# Patient Record
Sex: MALE | Race: WHITE | NOT HISPANIC OR LATINO | Employment: OTHER | URBAN - METROPOLITAN AREA
[De-identification: names, ages, dates, MRNs, and addresses within clinical notes are randomized per-mention and may not be internally consistent; named-entity substitution may affect disease eponyms.]

---

## 2023-03-15 ENCOUNTER — TELEPHONE (OUTPATIENT)
Dept: PRIMARY CARE | Facility: CLINIC | Age: 73
End: 2023-03-15
Payer: MEDICARE

## 2023-03-15 DIAGNOSIS — I10 HYPERTENSION, UNSPECIFIED TYPE: ICD-10-CM

## 2023-03-15 RX ORDER — AMLODIPINE BESYLATE 5 MG/1
1 TABLET ORAL DAILY
COMMUNITY
End: 2023-03-15 | Stop reason: SDUPTHER

## 2023-03-22 RX ORDER — AMLODIPINE BESYLATE 5 MG/1
5 TABLET ORAL DAILY
Qty: 90 TABLET | Refills: 1 | Status: SHIPPED | OUTPATIENT
Start: 2023-03-22 | End: 2023-07-17

## 2023-03-27 PROBLEM — E78.5 HYPERLIPIDEMIA: Status: ACTIVE | Noted: 2023-03-27

## 2023-04-24 PROBLEM — I10 HYPERTENSION: Status: ACTIVE | Noted: 2023-04-24

## 2023-05-26 ENCOUNTER — TELEPHONE (OUTPATIENT)
Dept: PRIMARY CARE | Facility: CLINIC | Age: 73
End: 2023-05-26

## 2023-06-08 DIAGNOSIS — J45.902 ASTHMA WITH STATUS ASTHMATICUS, UNSPECIFIED ASTHMA SEVERITY, UNSPECIFIED WHETHER PERSISTENT (HHS-HCC): ICD-10-CM

## 2023-06-27 ENCOUNTER — APPOINTMENT (OUTPATIENT)
Dept: PRIMARY CARE | Facility: CLINIC | Age: 73
End: 2023-06-27
Payer: MEDICARE

## 2023-07-10 ENCOUNTER — OFFICE VISIT (OUTPATIENT)
Dept: PRIMARY CARE | Facility: CLINIC | Age: 73
End: 2023-07-10
Payer: MEDICARE

## 2023-07-10 VITALS
HEIGHT: 66 IN | DIASTOLIC BLOOD PRESSURE: 85 MMHG | SYSTOLIC BLOOD PRESSURE: 123 MMHG | HEART RATE: 67 BPM | WEIGHT: 174 LBS | OXYGEN SATURATION: 95 % | BODY MASS INDEX: 27.97 KG/M2

## 2023-07-10 DIAGNOSIS — I10 HYPERTENSION, UNSPECIFIED TYPE: Primary | ICD-10-CM

## 2023-07-10 PROCEDURE — 3079F DIAST BP 80-89 MM HG: CPT | Performed by: INTERNAL MEDICINE

## 2023-07-10 PROCEDURE — 93000 ELECTROCARDIOGRAM COMPLETE: CPT | Performed by: INTERNAL MEDICINE

## 2023-07-10 PROCEDURE — 3074F SYST BP LT 130 MM HG: CPT | Performed by: INTERNAL MEDICINE

## 2023-07-10 PROCEDURE — 99213 OFFICE O/P EST LOW 20 MIN: CPT | Performed by: INTERNAL MEDICINE

## 2023-07-10 PROCEDURE — 1036F TOBACCO NON-USER: CPT | Performed by: INTERNAL MEDICINE

## 2023-07-10 PROCEDURE — 1159F MED LIST DOCD IN RCRD: CPT | Performed by: INTERNAL MEDICINE

## 2023-07-10 RX ORDER — DUPILUMAB 100 MG/.67ML
INJECTION, SOLUTION SUBCUTANEOUS
COMMUNITY
Start: 2022-03-28

## 2023-07-10 NOTE — PROGRESS NOTES
"Subjective   Patient ID: Sterling Rae is a 72 y.o. male who presents for the following    Follow up    MEDICARE WELLNESS 2022    Assessment/Plan   Htn: exercise stress test and low dose calcium score testing.     IFG: stable, continue to diet and exercise     hld: stable, continue statin     htn: stable, continue amlodipine     colonoscopy may 11, 2022 with dr skaggs in 7 years       PATIENT WANTS FULL CODE       HPI  male with HLD,htn, asthma, impaired fasting glucose comes for a      Patient wants a calcium score but he was never a smoker. Patient denies any chest pain or pressure when he is walking. He does treadmill walking exercising about 1 hour each day. Mom  of MI. Dad passed from stroke.         HTN: patient denies any headaches, blurred vision or dizziness. patient denies any stroke like symptoms     HLD: Patient denies any muscle aches and is tolerating statin therapy     impaired fasting glucose: diet controlled.         social: patient denies any smoking, drug or alcohol abuse     family history: dad passed MI, mom passed MI     surgical history: hernia repair     recently retired  and he goes to the  for an hour daily        Visit Vitals  /85   Pulse 67   Ht 1.676 m (5' 6\")   Wt 78.9 kg (174 lb)   SpO2 95%   BMI 28.08 kg/m²   Smoking Status Never   BSA 1.92 m²     PHYSICAL EXAM     General appearance: Alert and in no acute distress. speech is clear and coherent  HEENT: Sclera and conjunctiva white, EOMI,     Respiratory : No respiratory distress, normal respiratory rhythm and effort. Clear bilateral breath sounds. No wheezing or rhonchi.   Cardiovascular: heart rate regular, S1, S2. no murmurs. no Lower extremity edema  Skin inspection: Normal skin color and pigmentation, normal skin turgor and no visible rash, induration, or cellulitis  MSK: 5/5 strength upper and lower extremities without gait abnormalities. no loss of muscle mass   Neuro: 2-12 CN grossly intact.  no slurred " speech. no lateralizing deficit  Psychiatric Orientation: Oriented to person, place, and time. no depression, homicidal or suicidal thoughts, normal affect     REVIEW OF SYSTEMS   Constitutional: not feeling tired and no fever, chills or sweats. no headache  Cardiovascular: no exertional chest pain, no palpitations, no lower extremity edema    Lungs: Denies shortness of breath, exertional dyspnea, wheezing  Gastrointestinal: no change in bowel habits, no diarrhea, no nausea,    Skin: no rashes, no change in skin color and pigmentation and no skin lumps.    Psychiatric: no depression and no anxiety.        Allergies   Allergen Reactions    Other Other       Current Outpatient Medications   Medication Sig Dispense Refill    amLODIPine (Norvasc) 5 mg tablet Take 1 tablet (5 mg) by mouth once daily. 90 tablet 1    atorvastatin (Lipitor) 10 mg tablet TAKE 1 TABLET BY MOUTH EVERY DAY 90 tablet 3    dupilumab (Dupixent Syringe) 100 mg/0.67 mL syringe injection Inject under the skin.      losartan-hydrochlorothiazide (Hyzaar) 100-12.5 mg tablet TAKE 1 TABLET BY MOUTH EVERY DAY 90 tablet 3     No current facility-administered medications for this visit.       Objective     No visits with results within 4 Month(s) from this visit.   Latest known visit with results is:   Legacy Encounter on 02/04/2020   Component Date Value Ref Range Status    Vitamin D, 25-Hydroxy 02/04/2020 33  ng/mL Final    Hemoglobin A1C 02/04/2020 6.0  % Final    Estimated Average Glucose 02/04/2020 126  MG/DL Final    TSH 02/04/2020 1.78  0.44 - 3.98 mIU/L Final    WBC 02/04/2020 5.7  4.4 - 11.3 x10E9/L Final    nRBC 02/04/2020 0.0  0.0 - 0.0 /100 WBC Final    RBC 02/04/2020 4.58  4.50 - 5.90 x10E12/L Final    Hemoglobin 02/04/2020 14.2  13.5 - 17.5 g/dL Final    Hematocrit 02/04/2020 44.0  41.0 - 52.0 % Final    MCV 02/04/2020 96  80 - 100 fL Final    MCHC 02/04/2020 32.3  32.0 - 36.0 g/dL Final    Platelets 02/04/2020 262  150 - 450 x10E9/L Final     RDW 02/04/2020 13.9  11.5 - 14.5 % Final    PSA 02/04/2020 2.84  0.00 - 4.00 ng/mL Final    Glucose 02/04/2020 97  74 - 99 mg/dL Final    Sodium 02/04/2020 141  136 - 145 mmol/L Final    Potassium 02/04/2020 3.8  3.5 - 5.3 mmol/L Final    Chloride 02/04/2020 103  98 - 107 mmol/L Final    Bicarbonate 02/04/2020 28  21 - 32 mmol/L Final    Anion Gap 02/04/2020 14  10 - 20 mmol/L Final    Urea Nitrogen 02/04/2020 16  6 - 23 mg/dL Final    Creatinine 02/04/2020 0.92  0.50 - 1.30 mg/dL Final    GLOMERULAR FILTRATION RATE-NON AFR* 02/04/2020 >60  >60 mL/min/1.73m2 Final    GLOMERULAR FILTRATION RATE-* 02/04/2020 >60  >60 mL/min/1.73m2 Final    Calcium 02/04/2020 9.6  8.6 - 10.6 mg/dL Final    Albumin 02/04/2020 4.1  3.4 - 5.0 g/dL Final    Alkaline Phosphatase 02/04/2020 68  33 - 136 U/L Final    Total Protein 02/04/2020 7.2  6.4 - 8.2 g/dL Final    AST 02/04/2020 31  9 - 39 U/L Final    Total Bilirubin 02/04/2020 0.5  0.0 - 1.2 mg/dL Final    ALT (SGPT) 02/04/2020 30  10 - 52 U/L Final    Cholesterol 02/04/2020 200 (H)  0 - 199 mg/dL Final    HDL 02/04/2020 48.7  mg/dL Final    Cholesterol/HDL Ratio 02/04/2020 4.1   Final    LDL 02/04/2020 120 (H)  0 - 99 mg/dL Final    VLDL 02/04/2020 32  0 - 40 mg/dL Final    Triglycerides 02/04/2020 158 (H)  0 - 149 mg/dL Final       Radiology: Reviewed imaging in powerchart.  No results found.    No family history on file.  Social History     Socioeconomic History    Marital status:      Spouse name: None    Number of children: None    Years of education: None    Highest education level: None   Occupational History    None   Tobacco Use    Smoking status: Never    Smokeless tobacco: Never   Substance and Sexual Activity    Alcohol use: None    Drug use: None    Sexual activity: None   Other Topics Concern    None   Social History Narrative    None     Social Determinants of Health     Financial Resource Strain: Not on file   Food Insecurity: Not on file   Transportation  Needs: Not on file   Physical Activity: Not on file   Stress: Not on file   Social Connections: Not on file   Intimate Partner Violence: Not on file   Housing Stability: Not on file     Past Medical History:   Diagnosis Date    Personal history of other diseases of the respiratory system 05/01/2014    Personal history of asthma    Personal history of urinary calculi     Personal history of renal calculi     Past Surgical History:   Procedure Laterality Date    HERNIA REPAIR  05/01/2014    Hernia Repair       Charting was completed using voice recognition technology and may include unintended errors.

## 2023-07-13 DIAGNOSIS — I10 HYPERTENSION, UNSPECIFIED TYPE: ICD-10-CM

## 2023-07-17 RX ORDER — AMLODIPINE BESYLATE 5 MG/1
TABLET ORAL
Qty: 90 TABLET | Refills: 1 | Status: SHIPPED | OUTPATIENT
Start: 2023-07-17 | End: 2024-03-08

## 2023-07-27 DIAGNOSIS — T56.0X1D CHRONIC LEAD-INDUCED GOUT INVOLVING TOE WITHOUT TOPHUS, UNSPECIFIED LATERALITY, SUBSEQUENT ENCOUNTER: ICD-10-CM

## 2023-07-27 DIAGNOSIS — M1A.1790 CHRONIC LEAD-INDUCED GOUT INVOLVING TOE WITHOUT TOPHUS, UNSPECIFIED LATERALITY, SUBSEQUENT ENCOUNTER: ICD-10-CM

## 2023-08-15 DIAGNOSIS — M10.9 ACUTE GOUT INVOLVING TOE, UNSPECIFIED CAUSE, UNSPECIFIED LATERALITY: ICD-10-CM

## 2023-08-15 RX ORDER — ALLOPURINOL 300 MG/1
TABLET ORAL
COMMUNITY
End: 2023-08-15 | Stop reason: SDUPTHER

## 2023-08-15 RX ORDER — ALLOPURINOL 300 MG/1
TABLET ORAL
Qty: 90 TABLET | Refills: 3 | Status: SHIPPED | OUTPATIENT
Start: 2023-08-15

## 2023-08-18 RX ORDER — ALLOPURINOL 300 MG/1
300 TABLET ORAL DAILY
Qty: 90 TABLET | Refills: 3 | Status: SHIPPED | OUTPATIENT
Start: 2023-08-18

## 2023-08-24 ENCOUNTER — TELEPHONE (OUTPATIENT)
Dept: PRIMARY CARE | Facility: CLINIC | Age: 73
End: 2023-08-24
Payer: MEDICARE

## 2023-08-24 NOTE — TELEPHONE ENCOUNTER
I left a general msg for pt to call me back  I made an appt for Aug 29th at 11am for this pt to follow up with Dr Cannon r/t his Ca scoring.

## 2023-08-25 ENCOUNTER — TELEPHONE (OUTPATIENT)
Dept: PRIMARY CARE | Facility: CLINIC | Age: 73
End: 2023-08-25
Payer: MEDICARE

## 2023-08-29 ENCOUNTER — APPOINTMENT (OUTPATIENT)
Dept: PRIMARY CARE | Facility: CLINIC | Age: 73
End: 2023-08-29
Payer: MEDICARE

## 2023-09-08 ENCOUNTER — OFFICE VISIT (OUTPATIENT)
Dept: PRIMARY CARE | Facility: CLINIC | Age: 73
End: 2023-09-08
Payer: MEDICARE

## 2023-09-08 VITALS
OXYGEN SATURATION: 96 % | BODY MASS INDEX: 27.8 KG/M2 | DIASTOLIC BLOOD PRESSURE: 74 MMHG | HEIGHT: 66 IN | WEIGHT: 173 LBS | SYSTOLIC BLOOD PRESSURE: 114 MMHG | TEMPERATURE: 98.3 F | HEART RATE: 68 BPM

## 2023-09-08 DIAGNOSIS — I10 HYPERTENSION, UNSPECIFIED TYPE: ICD-10-CM

## 2023-09-08 DIAGNOSIS — E78.5 HYPERLIPIDEMIA, UNSPECIFIED HYPERLIPIDEMIA TYPE: ICD-10-CM

## 2023-09-08 DIAGNOSIS — R73.01 IMPAIRED FASTING GLUCOSE: ICD-10-CM

## 2023-09-08 DIAGNOSIS — E55.9 VITAMIN D DEFICIENCY: ICD-10-CM

## 2023-09-08 DIAGNOSIS — E78.5 HYPERLIPIDEMIA, UNSPECIFIED: ICD-10-CM

## 2023-09-08 DIAGNOSIS — R97.20 ELEVATED PROSTATE SPECIFIC ANTIGEN LESS THAN 10 NG/ML: ICD-10-CM

## 2023-09-08 DIAGNOSIS — Z00.00 WELLNESS EXAMINATION: Primary | ICD-10-CM

## 2023-09-08 LAB
NON-UH HIE A/G RATIO: 1.2
NON-UH HIE ALB: 4.1 G/DL (ref 3.4–5)
NON-UH HIE ALK PHOS: 67 UNIT/L (ref 46–116)
NON-UH HIE BILIRUBIN, TOTAL: 0.9 MG/DL (ref 0.2–1)
NON-UH HIE BUN/CREAT RATIO: 21.1
NON-UH HIE BUN: 19 MG/DL (ref 9–23)
NON-UH HIE CALCIUM: 9.7 MG/DL (ref 8.7–10.4)
NON-UH HIE CALCULATED LDL CHOLESTEROL: 137 MG/DL (ref 60–130)
NON-UH HIE CALCULATED OSMOLALITY: 279 MOSM/KG (ref 275–295)
NON-UH HIE CHLORIDE: 102 MMOL/L (ref 98–107)
NON-UH HIE CHOLESTEROL: 214 MG/DL (ref 100–200)
NON-UH HIE CO2, VENOUS: 30 MMOL/L (ref 20–31)
NON-UH HIE CREATININE: 0.9 MG/DL (ref 0.6–1.1)
NON-UH HIE GFR AA: >60
NON-UH HIE GLOBULIN: 3.3 G/DL
NON-UH HIE GLOMERULAR FILTRATION RATE: >60 ML/MIN/1.73M?
NON-UH HIE GLUCOSE: 83 MG/DL (ref 74–106)
NON-UH HIE GOT: 30 UNIT/L (ref 15–37)
NON-UH HIE GPT: 28 UNIT/L (ref 10–49)
NON-UH HIE HCT: 42.2 % (ref 41–52)
NON-UH HIE HDL CHOLESTEROL: 45 MG/DL (ref 40–60)
NON-UH HIE HGB A1C: 5.6 %
NON-UH HIE HGB: 14.2 G/DL (ref 13.5–17.5)
NON-UH HIE INSTR WBC ND: 5.9
NON-UH HIE K: 4.6 MMOL/L (ref 3.5–5.1)
NON-UH HIE MCH: 31.4 PG (ref 27–34)
NON-UH HIE MCHC: 33.6 G/DL (ref 32–37)
NON-UH HIE MCV: 93.7 FL (ref 80–100)
NON-UH HIE MPV: 7.7 FL (ref 7.4–10.4)
NON-UH HIE NA: 139 MMOL/L (ref 135–145)
NON-UH HIE PLATELET: 251 X10 (ref 150–450)
NON-UH HIE PROSTATIC SPECIFIC AG SCREEN: 3.8 NG/ML (ref 0–4)
NON-UH HIE RBC: 4.5 X10 (ref 4.7–6.1)
NON-UH HIE RDW: 14.9 % (ref 11.5–14.5)
NON-UH HIE TOTAL CHOL/HDL CHOL RATIO: 4.8
NON-UH HIE TOTAL PROTEIN: 7.4 G/DL (ref 5.7–8.2)
NON-UH HIE TRIGLYCERIDES: 159 MG/DL (ref 30–150)
NON-UH HIE TSH: 2.01 UIU/ML (ref 0.55–4.78)
NON-UH HIE VIT D 25: 47 NG/ML
NON-UH HIE WBC: 5.9 X10 (ref 4.5–11)

## 2023-09-08 PROCEDURE — G0439 PPPS, SUBSEQ VISIT: HCPCS | Performed by: INTERNAL MEDICINE

## 2023-09-08 PROCEDURE — 99214 OFFICE O/P EST MOD 30 MIN: CPT | Performed by: INTERNAL MEDICINE

## 2023-09-08 PROCEDURE — 1160F RVW MEDS BY RX/DR IN RCRD: CPT | Performed by: INTERNAL MEDICINE

## 2023-09-08 PROCEDURE — 1036F TOBACCO NON-USER: CPT | Performed by: INTERNAL MEDICINE

## 2023-09-08 PROCEDURE — 3074F SYST BP LT 130 MM HG: CPT | Performed by: INTERNAL MEDICINE

## 2023-09-08 PROCEDURE — 1170F FXNL STATUS ASSESSED: CPT | Performed by: INTERNAL MEDICINE

## 2023-09-08 PROCEDURE — 1159F MED LIST DOCD IN RCRD: CPT | Performed by: INTERNAL MEDICINE

## 2023-09-08 PROCEDURE — 3078F DIAST BP <80 MM HG: CPT | Performed by: INTERNAL MEDICINE

## 2023-09-08 RX ORDER — ATORVASTATIN CALCIUM 20 MG/1
20 TABLET, FILM COATED ORAL DAILY
Qty: 90 TABLET | Refills: 3 | Status: SHIPPED | OUTPATIENT
Start: 2023-09-08 | End: 2024-09-02

## 2023-09-08 ASSESSMENT — ACTIVITIES OF DAILY LIVING (ADL)
DRESSING: INDEPENDENT
TAKING_MEDICATION: INDEPENDENT
DOING_HOUSEWORK: INDEPENDENT
MANAGING_FINANCES: INDEPENDENT
BATHING: INDEPENDENT
GROCERY_SHOPPING: INDEPENDENT

## 2023-09-08 ASSESSMENT — PATIENT HEALTH QUESTIONNAIRE - PHQ9
SUM OF ALL RESPONSES TO PHQ9 QUESTIONS 1 AND 2: 0
1. LITTLE INTEREST OR PLEASURE IN DOING THINGS: NOT AT ALL
2. FEELING DOWN, DEPRESSED OR HOPELESS: NOT AT ALL

## 2023-09-08 NOTE — PROGRESS NOTES
"Subjective   Patient ID: Sterling Rae is a 72 y.o. male who presents for the following    MEDICARE WELLNESS 9/16/2022  and follow up   Assessment/Plan   Elevated calcium score: 150s, recommend 81 mg aspirin , will reduce below 70     Htn: exercise stress test and low dose calcium score testing.     IFG: stable, continue to diet and exercise     hld: stable, continue statin,      htn: stable, continue amlodipine     colonoscopy may 11, 2022 with dr skaggs in 7 years       PATIENT WANTS FULL CODE       HPI  male with HLD,htn, asthma, impaired fasting glucose comes for a            Calcium score 150s recommend baby aspirin      HTN: patient denies any headaches, blurred vision or dizziness. patient denies any stroke like symptoms     HLD: Patient denies any muscle aches and is tolerating statin therapy     impaired fasting glucose: diet controlled.       social: patient denies any smoking, drug or alcohol abuse     family history: dad passed MI, mom passed MI     surgical history: hernia repair     recently retired 2021 and he goes to the  for an hour daily        Visit Vitals  /74 (BP Location: Right arm, Patient Position: Sitting)   Pulse 68   Temp 36.8 °C (98.3 °F)   Ht 1.676 m (5' 6\")   Wt 78.5 kg (173 lb)   SpO2 96%   BMI 27.92 kg/m²   Smoking Status Never   BSA 1.91 m²     PHYSICAL EXAM     General appearance: Alert and in no acute distress. speech is clear and coherent  HEENT: Sclera and conjunctiva white, EOMI,     Respiratory : No respiratory distress, normal respiratory rhythm and effort. Clear bilateral breath sounds. No wheezing or rhonchi.   Cardiovascular: heart rate regular, S1, S2. no murmurs. no Lower extremity edema  Skin inspection: Normal skin color and pigmentation, normal skin turgor and no visible rash, induration, or cellulitis  MSK: 5/5 strength upper and lower extremities without gait abnormalities. no loss of muscle mass   Neuro: 2-12 CN grossly intact.  no slurred speech. no " lateralizing deficit  Psychiatric Orientation: Oriented to person, place, and time. no depression, homicidal or suicidal thoughts, normal affect     REVIEW OF SYSTEMS   Constitutional: not feeling tired and no fever, chills or sweats. no headache  Cardiovascular: no exertional chest pain, no palpitations, no lower extremity edema    Lungs: Denies shortness of breath, exertional dyspnea, wheezing  Gastrointestinal: no change in bowel habits, no diarrhea, no nausea,    Skin: no rashes, no change in skin color and pigmentation and no skin lumps.    Psychiatric: no depression and no anxiety.        Allergies   Allergen Reactions   • Other Other       Current Outpatient Medications   Medication Sig Dispense Refill   • allopurinol (Zyloprim) 300 mg tablet TAKE 1 TABLET BY MOUTH EVERY DAY 90 tablet 3   • allopurinol (Zyloprim) 300 mg tablet Take 1 tablet daily 90 tablet 3   • amLODIPine (Norvasc) 5 mg tablet TAKE 1 TABLET BY MOUTH EVERY DAY 90 tablet 1   • atorvastatin (Lipitor) 10 mg tablet TAKE 1 TABLET BY MOUTH EVERY DAY 90 tablet 3   • dupilumab (Dupixent Syringe) 100 mg/0.67 mL syringe injection Inject under the skin.     • losartan-hydrochlorothiazide (Hyzaar) 100-12.5 mg tablet TAKE 1 TABLET BY MOUTH EVERY DAY 90 tablet 3     No current facility-administered medications for this visit.       Objective     No visits with results within 4 Month(s) from this visit.   Latest known visit with results is:   Legacy Encounter on 02/04/2020   Component Date Value Ref Range Status   • Vitamin D, 25-Hydroxy 02/04/2020 33  ng/mL Final   • Hemoglobin A1C 02/04/2020 6.0  % Final   • Estimated Average Glucose 02/04/2020 126  MG/DL Final   • TSH 02/04/2020 1.78  0.44 - 3.98 mIU/L Final   • WBC 02/04/2020 5.7  4.4 - 11.3 x10E9/L Final   • nRBC 02/04/2020 0.0  0.0 - 0.0 /100 WBC Final   • RBC 02/04/2020 4.58  4.50 - 5.90 x10E12/L Final   • Hemoglobin 02/04/2020 14.2  13.5 - 17.5 g/dL Final   • Hematocrit 02/04/2020 44.0  41.0 - 52.0  % Final   • MCV 02/04/2020 96  80 - 100 fL Final   • MCHC 02/04/2020 32.3  32.0 - 36.0 g/dL Final   • Platelets 02/04/2020 262  150 - 450 x10E9/L Final   • RDW 02/04/2020 13.9  11.5 - 14.5 % Final   • PSA 02/04/2020 2.84  0.00 - 4.00 ng/mL Final   • Glucose 02/04/2020 97  74 - 99 mg/dL Final   • Sodium 02/04/2020 141  136 - 145 mmol/L Final   • Potassium 02/04/2020 3.8  3.5 - 5.3 mmol/L Final   • Chloride 02/04/2020 103  98 - 107 mmol/L Final   • Bicarbonate 02/04/2020 28  21 - 32 mmol/L Final   • Anion Gap 02/04/2020 14  10 - 20 mmol/L Final   • Urea Nitrogen 02/04/2020 16  6 - 23 mg/dL Final   • Creatinine 02/04/2020 0.92  0.50 - 1.30 mg/dL Final   • GLOMERULAR FILTRATION RATE-NON AFR* 02/04/2020 >60  >60 mL/min/1.73m2 Final   • GLOMERULAR FILTRATION RATE-* 02/04/2020 >60  >60 mL/min/1.73m2 Final   • Calcium 02/04/2020 9.6  8.6 - 10.6 mg/dL Final   • Albumin 02/04/2020 4.1  3.4 - 5.0 g/dL Final   • Alkaline Phosphatase 02/04/2020 68  33 - 136 U/L Final   • Total Protein 02/04/2020 7.2  6.4 - 8.2 g/dL Final   • AST 02/04/2020 31  9 - 39 U/L Final   • Total Bilirubin 02/04/2020 0.5  0.0 - 1.2 mg/dL Final   • ALT (SGPT) 02/04/2020 30  10 - 52 U/L Final   • Cholesterol 02/04/2020 200 (H)  0 - 199 mg/dL Final   • HDL 02/04/2020 48.7  mg/dL Final   • Cholesterol/HDL Ratio 02/04/2020 4.1   Final   • LDL 02/04/2020 120 (H)  0 - 99 mg/dL Final   • VLDL 02/04/2020 32  0 - 40 mg/dL Final   • Triglycerides 02/04/2020 158 (H)  0 - 149 mg/dL Final       Radiology: Reviewed imaging in powerchart.  No results found.    No family history on file.  Social History     Socioeconomic History   • Marital status:      Spouse name: None   • Number of children: None   • Years of education: None   • Highest education level: None   Occupational History   • None   Tobacco Use   • Smoking status: Never   • Smokeless tobacco: Never   Substance and Sexual Activity   • Alcohol use: Not Currently   • Drug use: Never   • Sexual  activity: None   Other Topics Concern   • None   Social History Narrative   • None     Social Determinants of Health     Financial Resource Strain: Not on file   Food Insecurity: Not on file   Transportation Needs: Not on file   Physical Activity: Not on file   Stress: Not on file   Social Connections: Not on file   Intimate Partner Violence: Not on file   Housing Stability: Not on file     Past Medical History:   Diagnosis Date   • Personal history of other diseases of the respiratory system 05/01/2014    Personal history of asthma   • Personal history of urinary calculi     Personal history of renal calculi     Past Surgical History:   Procedure Laterality Date   • HERNIA REPAIR  05/01/2014    Hernia Repair       Charting was completed using voice recognition technology and may include unintended errors.

## 2023-09-19 ENCOUNTER — TELEPHONE (OUTPATIENT)
Dept: PRIMARY CARE | Facility: CLINIC | Age: 73
End: 2023-09-19
Payer: MEDICARE

## 2023-09-19 NOTE — TELEPHONE ENCOUNTER
Called pt back and left a general msg for him to call back    His triglyceride level is elevated and e needs to watch his carb intake  We will redraw labs at his next appt-needs to fast if he wants to have them drawn the same day as the appt

## 2023-09-22 ENCOUNTER — TELEPHONE (OUTPATIENT)
Dept: PRIMARY CARE | Facility: CLINIC | Age: 73
End: 2023-09-22
Payer: MEDICARE

## 2023-09-22 NOTE — TELEPHONE ENCOUNTER
Explained to watch carbs and fat intake and no med changes at this time. He verbalized udnerstanding

## 2023-10-04 ENCOUNTER — APPOINTMENT (OUTPATIENT)
Dept: PRIMARY CARE | Facility: CLINIC | Age: 73
End: 2023-10-04
Payer: MEDICARE

## 2024-03-07 DIAGNOSIS — I10 HYPERTENSION, UNSPECIFIED TYPE: ICD-10-CM

## 2024-03-08 RX ORDER — AMLODIPINE BESYLATE 5 MG/1
TABLET ORAL
Qty: 90 TABLET | Refills: 3 | Status: SHIPPED | OUTPATIENT
Start: 2024-03-08

## 2024-04-10 DIAGNOSIS — I10 ESSENTIAL (PRIMARY) HYPERTENSION: ICD-10-CM

## 2024-04-12 RX ORDER — LOSARTAN POTASSIUM AND HYDROCHLOROTHIAZIDE 12.5; 1 MG/1; MG/1
TABLET ORAL
Qty: 90 TABLET | Refills: 0 | Status: SHIPPED | OUTPATIENT
Start: 2024-04-12

## 2024-06-19 ENCOUNTER — TELEPHONE (OUTPATIENT)
Dept: PRIMARY CARE | Facility: CLINIC | Age: 74
End: 2024-06-19
Payer: MEDICARE

## 2024-06-19 NOTE — TELEPHONE ENCOUNTER
AMLODIPINE 5 MG   [facility] [phone]    I LEFT  FOR PT. RX WAS CALLED IN MARCH 90 DAY WITH 3 REFILLS

## 2024-07-12 DIAGNOSIS — I10 ESSENTIAL (PRIMARY) HYPERTENSION: ICD-10-CM

## 2024-07-12 RX ORDER — LOSARTAN POTASSIUM AND HYDROCHLOROTHIAZIDE 12.5; 1 MG/1; MG/1
TABLET ORAL
Qty: 90 TABLET | Refills: 3 | Status: SHIPPED | OUTPATIENT
Start: 2024-07-12

## 2024-08-02 ENCOUNTER — APPOINTMENT (OUTPATIENT)
Dept: PRIMARY CARE | Facility: CLINIC | Age: 74
End: 2024-08-02
Payer: MEDICARE

## 2024-08-13 ENCOUNTER — APPOINTMENT (OUTPATIENT)
Dept: PRIMARY CARE | Facility: CLINIC | Age: 74
End: 2024-08-13
Payer: MEDICARE

## 2024-08-13 VITALS
SYSTOLIC BLOOD PRESSURE: 116 MMHG | OXYGEN SATURATION: 93 % | BODY MASS INDEX: 28.77 KG/M2 | DIASTOLIC BLOOD PRESSURE: 76 MMHG | WEIGHT: 179 LBS | HEART RATE: 86 BPM | HEIGHT: 66 IN

## 2024-08-13 DIAGNOSIS — E78.5 HYPERLIPIDEMIA, UNSPECIFIED HYPERLIPIDEMIA TYPE: ICD-10-CM

## 2024-08-13 DIAGNOSIS — R73.01 IMPAIRED FASTING GLUCOSE: ICD-10-CM

## 2024-08-13 DIAGNOSIS — R97.20 ELEVATED PROSTATE SPECIFIC ANTIGEN LESS THAN 10 NG/ML: ICD-10-CM

## 2024-08-13 DIAGNOSIS — Z00.00 WELLNESS EXAMINATION: Primary | ICD-10-CM

## 2024-08-13 DIAGNOSIS — I10 HYPERTENSION, UNSPECIFIED TYPE: ICD-10-CM

## 2024-08-13 PROCEDURE — 1160F RVW MEDS BY RX/DR IN RCRD: CPT | Performed by: INTERNAL MEDICINE

## 2024-08-13 PROCEDURE — 1170F FXNL STATUS ASSESSED: CPT | Performed by: INTERNAL MEDICINE

## 2024-08-13 PROCEDURE — 3074F SYST BP LT 130 MM HG: CPT | Performed by: INTERNAL MEDICINE

## 2024-08-13 PROCEDURE — 3008F BODY MASS INDEX DOCD: CPT | Performed by: INTERNAL MEDICINE

## 2024-08-13 PROCEDURE — 3078F DIAST BP <80 MM HG: CPT | Performed by: INTERNAL MEDICINE

## 2024-08-13 PROCEDURE — 1159F MED LIST DOCD IN RCRD: CPT | Performed by: INTERNAL MEDICINE

## 2024-08-13 PROCEDURE — 1036F TOBACCO NON-USER: CPT | Performed by: INTERNAL MEDICINE

## 2024-08-13 PROCEDURE — 99214 OFFICE O/P EST MOD 30 MIN: CPT | Performed by: INTERNAL MEDICINE

## 2024-08-13 ASSESSMENT — ACTIVITIES OF DAILY LIVING (ADL)
DRESSING: INDEPENDENT
GROCERY_SHOPPING: INDEPENDENT
BATHING: INDEPENDENT
DOING_HOUSEWORK: INDEPENDENT
TAKING_MEDICATION: INDEPENDENT
MANAGING_FINANCES: INDEPENDENT

## 2024-08-13 ASSESSMENT — PATIENT HEALTH QUESTIONNAIRE - PHQ9
1. LITTLE INTEREST OR PLEASURE IN DOING THINGS: NOT AT ALL
SUM OF ALL RESPONSES TO PHQ9 QUESTIONS 1 AND 2: 0
2. FEELING DOWN, DEPRESSED OR HOPELESS: NOT AT ALL

## 2024-08-13 NOTE — PROGRESS NOTES
"Subjective   Patient ID: Sterling Rae is a 73 y.o. male who presents for the following  Chronic disease follow up  MEDICARE WELLNESS 8/13/2024  Assessment/Plan   Elevated calcium score: 150s, recommend 81 mg aspirin , will reduce below 70     Htn: exercise stress test and low dose calcium score testing.     IFG: stable, continue to diet and exercise     hld: stable, continue 10mg atorvastatin ( patient did not tolerate higher doses)     htn: stable, continue amlodipine     colonoscopy may 11, 2022 with dr skaggs in 7 years       PATIENT WANTS FULL CODE       HPI  male with HLD,htn, asthma, impaired fasting glucose comes for a      Calcium score 150s recommend baby aspirin      HTN: patient denies any headaches, blurred vision or dizziness. patient denies any stroke like symptoms     HLD: Patient denies any muscle aches and is tolerating statin therapy     impaired fasting glucose: diet controlled.       social: patient denies any smoking, drug or alcohol abuse     family history: dad passed MI, mom passed MI     surgical history: hernia repair     recently retired 2021 and he goes to the  for an hour daily        Visit Vitals  /76 (BP Location: Left arm, Patient Position: Sitting, BP Cuff Size: Adult)   Pulse 86   Ht 1.676 m (5' 6\")   Wt 81.2 kg (179 lb)   SpO2 93%   BMI 28.89 kg/m²   Smoking Status Never   BSA 1.94 m²     PHYSICAL EXAM   General appearance: Alert and in no acute distress. speech is clear and coherent  HEENT: Sclera and conjunctiva white, EOMI, uvela midline, no mouth lesions. PERRLA,  nasal turbinates are not swollen without exudate. TM's Lea with cone of light, external ear canal with scant cerumen. No head trauma  Neck: no carotid bruits or thyromegaly. no lymphadenopathy   Respiratory : No respiratory distress, normal respiratory rhythm and effort. Clear bilateral breath sounds. No wheezing or rhonchi.   Cardiovascular: heart rate regular, S1, S2. no murmurs. no Lower extremity " edema  Skin inspection: Normal skin color and pigmentation, normal skin turgor and no visible rash, induration, or cellulitis  MSK: 5/5 strength upper and lower extremities without gait abnormalities. no loss of muscle mass   Neuro: 2-12 CN grossly intact.  no slurred speech. no lateralizing deficit  Psychiatric Orientation: Oriented to person, place, and time. no depression, homicidal or suicidal thoughts, normal affect  Abdomen: soft, none tender, none distended. no organomegaly      REVIEW OF SYSTEMS   Constitutional: not feeling tired and no fever, chills or sweats. no headache  Cardiovascular: no exertional chest pain, no palpitations, no lower extremity edema    Lungs: Denies shortness of breath, exertional dyspnea, wheezing  Gastrointestinal: no change in bowel habits, no diarrhea, no nausea,    Skin: no rashes, no change in skin color and pigmentation and no skin lumps.    Psychiatric: no depression and no anxiety.        Allergies   Allergen Reactions    Fish Containing Products Rash and Shortness of breath    Tree Nuts Rash and Shortness of breath    Other Other       Current Outpatient Medications   Medication Sig Dispense Refill    allopurinol (Zyloprim) 300 mg tablet Take 1 tablet daily 90 tablet 3    amLODIPine (Norvasc) 5 mg tablet TAKE 1 TABLET BY MOUTH EVERY DAY 90 tablet 3    atorvastatin (Lipitor) 10 mg tablet TAKE 1 TABLET BY MOUTH EVERY DAY 90 tablet 2    dupilumab (Dupixent Syringe) 100 mg/0.67 mL syringe injection Inject under the skin.      losartan-hydrochlorothiazide (Hyzaar) 100-12.5 mg tablet TAKE 1 TABLET BY MOUTH EVERY DAY 90 tablet 3    allopurinol (Zyloprim) 300 mg tablet TAKE 1 TABLET BY MOUTH EVERY DAY 90 tablet 3    atorvastatin (Lipitor) 20 mg tablet Take 1 tablet (20 mg) by mouth once daily. (Patient not taking: Reported on 8/13/2024) 90 tablet 3     No current facility-administered medications for this visit.       Objective     No visits with results within 4 Month(s) from this  visit.   Latest known visit with results is:   Orders Only on 09/08/2023   Component Date Value Ref Range Status    [facility] MCHC 09/08/2023 33.6  32.0 - 37.0 g/dL Final    [facility] Instr WBC ND 09/08/2023 5.9   Final    [facility] MCV 09/08/2023 93.7  80.0 - 100.0 fL Final    [facility] HGB 09/08/2023 14.2  13.5 - 17.5 g/dL Final    [facility] MPV 09/08/2023 7.7  7.4 - 10.4 fL Final    [facility] WBC 09/08/2023 5.9  4.5 - 11.0 x10 Final    [facility] RDW 09/08/2023 14.9 (H)  11.5 - 14.5 % Final    [facility] MCH 09/08/2023 31.4  27.0 - 34.0 pg Final    [facility] HCT 09/08/2023 42.2  41.0 - 52.0 % Final    [facility] RBC 09/08/2023 4.50 (L)  4.70 - 6.10 x10 Final    [facility] Platelet 09/08/2023 251  150 - 450 x10 Final    [facility] Prostatic Specific Ag S* 09/08/2023 3.8  0.0 - 4.0 ng/mL Final    [facility] Alk Phos 09/08/2023 67  46 - 116 unit/L Final    [facility] GOT 09/08/2023 30  15 - 37 unit/L Final    [facility] GFR AA 09/08/2023 >60   Final    [facility] Bilirubin, Total 09/08/2023 0.90  0.20 - 1.00 mg/dL Final    [facility] K 09/08/2023 4.6  3.5 - 5.1 mmol/L Final    [facility] A/G Ratio 09/08/2023 1.2   Final    [facility] CO2, venous 09/08/2023 30.0  20.0 - 31.0 mmol/L Final    [facility] GPT 09/08/2023 28  10 - 49 unit/L Final    [facility] Chloride 09/08/2023 102  98 - 107 mmol/L Final    [facility] Calcium 09/08/2023 9.7  8.7 - 10.4 mg/dL Final    [facility] Total Protein 09/08/2023 7.4  5.7 - 8.2 g/dL Final    [facility] Creatinine 09/08/2023 0.9  0.6 - 1.1 mg/dL Final    [facility] ALB 09/08/2023 4.1  3.4 - 5.0 g/dL Final    [facility] BUN 09/08/2023 19  9 - 23 mg/dL Final    [facility] Glomerular Filtration R* 09/08/2023 >60  mL/min/1.73m? Final    [facility] Calculated Osmolality 09/08/2023 279  275 - 295 mOsm/kg Final    [facility] Glucose 09/08/2023 83  74 - 106 mg/dL Final    [facility] Globulin 09/08/2023 3.3  g/dL Final    [facility] Na 09/08/2023 139  135 - 145 mmol/L Final    [facility]  [facility] BUN/Creat Ratio 09/08/2023 21.1   Final    [facility] Vit D 25 09/08/2023 47  ng/mL Final    [facility] TSH 09/08/2023 2.01  0.55 - 4.78 uIU/ml Final    [facility] Calculated LDL [place]ol[place]* 09/08/2023 137 (H)  60 - 130 mg/dL Final    [facility] HDL Cholesterol 09/08/2023 45  40 - 60 mg/dL Final    [facility] Total Chol/HDL Chol Rat* 09/08/2023 4.8   Final    [facility] Triglycerides 09/08/2023 159 (H)  30 - 150 mg/dL Final    [facility] Cholesterol 09/08/2023 214 (H)  100 - 200 mg/dL Final    [facility] HGB A1C 09/08/2023 5.6  % Final       Radiology: Reviewed imaging in powerchart.  No results found.    No family history on file.  Social History     Socioeconomic History    Marital status:    Tobacco Use    Smoking status: Never    Smokeless tobacco: Never   Substance and Sexual Activity    Alcohol use: Not Currently    Drug use: Never     Past Medical History:   Diagnosis Date    Personal history of other diseases of the respiratory system 05/01/2014    Personal history of asthma    Personal history of urinary calculi     Personal history of renal calculi     Past Surgical History:   Procedure Laterality Date    HERNIA REPAIR  05/01/2014    Hernia Repair       Charting was completed using voice recognition technology and may include unintended errors.

## 2024-08-15 ENCOUNTER — LAB (OUTPATIENT)
Dept: LAB | Facility: LAB | Age: 74
End: 2024-08-15
Payer: MEDICARE

## 2024-08-15 DIAGNOSIS — I10 HYPERTENSION, UNSPECIFIED TYPE: ICD-10-CM

## 2024-08-15 DIAGNOSIS — E78.5 HYPERLIPIDEMIA, UNSPECIFIED HYPERLIPIDEMIA TYPE: ICD-10-CM

## 2024-08-15 DIAGNOSIS — E55.9 VITAMIN D DEFICIENCY: ICD-10-CM

## 2024-08-15 DIAGNOSIS — R97.20 ELEVATED PROSTATE SPECIFIC ANTIGEN LESS THAN 10 NG/ML: ICD-10-CM

## 2024-08-15 DIAGNOSIS — R73.01 IMPAIRED FASTING GLUCOSE: ICD-10-CM

## 2024-08-15 LAB
25(OH)D3 SERPL-MCNC: 31 NG/ML (ref 30–100)
ALBUMIN SERPL BCP-MCNC: 4.4 G/DL (ref 3.4–5)
ALP SERPL-CCNC: 57 U/L (ref 33–136)
ALT SERPL W P-5'-P-CCNC: 28 U/L (ref 10–52)
ANION GAP SERPL CALC-SCNC: 10 MMOL/L (ref 10–20)
AST SERPL W P-5'-P-CCNC: 29 U/L (ref 9–39)
BILIRUB SERPL-MCNC: 0.8 MG/DL (ref 0–1.2)
BUN SERPL-MCNC: 18 MG/DL (ref 6–23)
CALCIUM SERPL-MCNC: 9.5 MG/DL (ref 8.6–10.3)
CHLORIDE SERPL-SCNC: 103 MMOL/L (ref 98–107)
CHOLEST SERPL-MCNC: 185 MG/DL (ref 0–199)
CHOLESTEROL/HDL RATIO: 3.7
CO2 SERPL-SCNC: 30 MMOL/L (ref 21–32)
CREAT SERPL-MCNC: 0.93 MG/DL (ref 0.5–1.3)
EGFRCR SERPLBLD CKD-EPI 2021: 87 ML/MIN/1.73M*2
ERYTHROCYTE [DISTWIDTH] IN BLOOD BY AUTOMATED COUNT: 14 % (ref 11.5–14.5)
EST. AVERAGE GLUCOSE BLD GHB EST-MCNC: 123 MG/DL
GLUCOSE SERPL-MCNC: 92 MG/DL (ref 74–99)
HBA1C MFR BLD: 5.9 %
HCT VFR BLD AUTO: 43.3 % (ref 41–52)
HDLC SERPL-MCNC: 50.6 MG/DL
HGB BLD-MCNC: 14.4 G/DL (ref 13.5–17.5)
LDLC SERPL CALC-MCNC: 108 MG/DL
MCH RBC QN AUTO: 32.1 PG (ref 26–34)
MCHC RBC AUTO-ENTMCNC: 33.3 G/DL (ref 32–36)
MCV RBC AUTO: 97 FL (ref 80–100)
NON HDL CHOLESTEROL: 134 MG/DL (ref 0–149)
NRBC BLD-RTO: 0 /100 WBCS (ref 0–0)
PLATELET # BLD AUTO: 268 X10*3/UL (ref 150–450)
POTASSIUM SERPL-SCNC: 3.9 MMOL/L (ref 3.5–5.3)
PROT SERPL-MCNC: 7.4 G/DL (ref 6.4–8.2)
PSA SERPL-MCNC: 4.37 NG/ML
RBC # BLD AUTO: 4.48 X10*6/UL (ref 4.5–5.9)
SODIUM SERPL-SCNC: 139 MMOL/L (ref 136–145)
TRIGL SERPL-MCNC: 132 MG/DL (ref 0–149)
TSH SERPL-ACNC: 3.28 MIU/L (ref 0.44–3.98)
VLDL: 26 MG/DL (ref 0–40)
WBC # BLD AUTO: 5.8 X10*3/UL (ref 4.4–11.3)

## 2024-08-15 PROCEDURE — 84153 ASSAY OF PSA TOTAL: CPT

## 2024-08-15 PROCEDURE — 85027 COMPLETE CBC AUTOMATED: CPT

## 2024-08-15 PROCEDURE — 80061 LIPID PANEL: CPT

## 2024-08-15 PROCEDURE — 83036 HEMOGLOBIN GLYCOSYLATED A1C: CPT

## 2024-08-15 PROCEDURE — 84443 ASSAY THYROID STIM HORMONE: CPT

## 2024-08-15 PROCEDURE — 36415 COLL VENOUS BLD VENIPUNCTURE: CPT

## 2024-08-15 PROCEDURE — 82306 VITAMIN D 25 HYDROXY: CPT

## 2024-08-15 PROCEDURE — 80053 COMPREHEN METABOLIC PANEL: CPT

## 2024-08-16 DIAGNOSIS — R97.20 ELEVATED PROSTATE SPECIFIC ANTIGEN LESS THAN 10 NG/ML: Primary | ICD-10-CM

## 2024-08-19 ENCOUNTER — TELEPHONE (OUTPATIENT)
Dept: PRIMARY CARE | Facility: CLINIC | Age: 74
End: 2024-08-19
Payer: MEDICARE

## 2024-08-19 NOTE — TELEPHONE ENCOUNTER
----- Message from Betito Cannon sent at 8/16/2024  3:59 PM EDT -----  Recommend repeat PSA in 3 months. No appointment necessary. I just want to track your psa closer.

## 2024-08-19 NOTE — TELEPHONE ENCOUNTER
Lvmtcb Recommend repeat PSA in 3 months. No appointment necessary. I just want to track your psa closer.

## 2024-08-21 ENCOUNTER — TELEPHONE (OUTPATIENT)
Dept: PRIMARY CARE | Facility: CLINIC | Age: 74
End: 2024-08-21
Payer: MEDICARE

## 2024-08-22 ENCOUNTER — TELEPHONE (OUTPATIENT)
Dept: PRIMARY CARE | Facility: CLINIC | Age: 74
End: 2024-08-22
Payer: MEDICARE

## 2024-09-02 DIAGNOSIS — M10.9 ACUTE GOUT INVOLVING TOE, UNSPECIFIED CAUSE, UNSPECIFIED LATERALITY: ICD-10-CM

## 2024-09-03 RX ORDER — ALLOPURINOL 300 MG/1
TABLET ORAL
Qty: 90 TABLET | Refills: 3 | Status: SHIPPED | OUTPATIENT
Start: 2024-09-03

## 2024-10-18 ENCOUNTER — LAB (OUTPATIENT)
Dept: LAB | Facility: LAB | Age: 74
End: 2024-10-18
Payer: MEDICARE

## 2024-10-18 DIAGNOSIS — R97.20 ELEVATED PROSTATE SPECIFIC ANTIGEN LESS THAN 10 NG/ML: ICD-10-CM

## 2024-10-18 LAB — PSA SERPL-MCNC: 3.79 NG/ML

## 2024-10-18 PROCEDURE — 36415 COLL VENOUS BLD VENIPUNCTURE: CPT

## 2024-10-18 PROCEDURE — 84153 ASSAY OF PSA TOTAL: CPT

## 2024-10-24 ENCOUNTER — TELEPHONE (OUTPATIENT)
Dept: PRIMARY CARE | Facility: CLINIC | Age: 74
End: 2024-10-24
Payer: MEDICARE

## 2024-10-24 NOTE — TELEPHONE ENCOUNTER
----- Message from Betito Cannon sent at 10/24/2024  7:09 AM EDT -----  Fyi PSA trended down. Will follow up with another PSA in 6 months to follow closely

## 2024-10-25 ENCOUNTER — TELEPHONE (OUTPATIENT)
Dept: PRIMARY CARE | Facility: CLINIC | Age: 74
End: 2024-10-25
Payer: MEDICARE

## 2025-01-11 DIAGNOSIS — E78.5 HYPERLIPIDEMIA, UNSPECIFIED HYPERLIPIDEMIA TYPE: ICD-10-CM

## 2025-01-13 RX ORDER — ATORVASTATIN CALCIUM 10 MG/1
10 TABLET, FILM COATED ORAL DAILY
Qty: 90 TABLET | Refills: 3 | Status: SHIPPED | OUTPATIENT
Start: 2025-01-13 | End: 2025-01-15 | Stop reason: SDUPTHER

## 2025-01-15 DIAGNOSIS — E78.5 HYPERLIPIDEMIA, UNSPECIFIED HYPERLIPIDEMIA TYPE: ICD-10-CM

## 2025-01-15 RX ORDER — ATORVASTATIN CALCIUM 10 MG/1
10 TABLET, FILM COATED ORAL DAILY
Qty: 90 TABLET | Refills: 3 | Status: SHIPPED | OUTPATIENT
Start: 2025-01-15

## 2025-01-27 DIAGNOSIS — I10 ESSENTIAL (PRIMARY) HYPERTENSION: ICD-10-CM

## 2025-01-27 RX ORDER — LOSARTAN POTASSIUM AND HYDROCHLOROTHIAZIDE 12.5; 1 MG/1; MG/1
1 TABLET ORAL DAILY
Qty: 90 TABLET | Refills: 3 | Status: SHIPPED | OUTPATIENT
Start: 2025-01-27

## 2025-03-15 DIAGNOSIS — I10 HYPERTENSION, UNSPECIFIED TYPE: ICD-10-CM

## 2025-03-18 RX ORDER — AMLODIPINE BESYLATE 5 MG/1
TABLET ORAL
Qty: 90 TABLET | Refills: 3 | Status: SHIPPED | OUTPATIENT
Start: 2025-03-18

## 2025-04-15 DIAGNOSIS — Z12.5 SCREENING FOR PROSTATE CANCER: Primary | ICD-10-CM

## 2025-04-17 LAB — PSA SERPL-MCNC: 4.42 NG/ML

## 2025-04-22 ENCOUNTER — TELEPHONE (OUTPATIENT)
Dept: PRIMARY CARE | Facility: CLINIC | Age: 75
End: 2025-04-22
Payer: MEDICARE

## 2025-04-22 NOTE — TELEPHONE ENCOUNTER
----- Message from Betito Cannon sent at 4/18/2025 10:20 AM EDT -----  PSA trended up quicker then expected. Recommend referral to urology for further assessment at this time. ([facility] would be good)  ----- Message -----  From: [facility] Results In  Sent: 4/17/2025  11:25 PM EDT  To: Betito Cannon, DO

## 2025-06-25 DIAGNOSIS — D64.9 ANEMIA, UNSPECIFIED TYPE: Primary | ICD-10-CM

## 2025-06-25 DIAGNOSIS — Z12.5 SPECIAL SCREENING, PROSTATE CANCER: ICD-10-CM

## 2025-06-26 LAB
ERYTHROCYTE [DISTWIDTH] IN BLOOD BY AUTOMATED COUNT: 14.4 % (ref 11–15)
FERRITIN SERPL-MCNC: 144 NG/ML (ref 24–380)
FOLATE SERPL-MCNC: 15.9 NG/ML
HCT VFR BLD AUTO: 42.3 % (ref 38.5–50)
HGB BLD-MCNC: 13.2 G/DL (ref 13.2–17.1)
IRON SATN MFR SERPL: 32 % (CALC) (ref 20–48)
IRON SERPL-MCNC: 86 MCG/DL (ref 50–180)
MCH RBC QN AUTO: 30.8 PG (ref 27–33)
MCHC RBC AUTO-ENTMCNC: 31.2 G/DL (ref 32–36)
MCV RBC AUTO: 98.6 FL (ref 80–100)
PLATELET # BLD AUTO: 266 THOUSAND/UL (ref 140–400)
PMV BLD REES-ECKER: 9.5 FL (ref 7.5–12.5)
PSA SERPL-MCNC: 4.36 NG/ML
RBC # BLD AUTO: 4.29 MILLION/UL (ref 4.2–5.8)
TIBC SERPL-MCNC: 270 MCG/DL (CALC) (ref 250–425)
VIT B12 SERPL-MCNC: 692 PG/ML (ref 200–1100)
WBC # BLD AUTO: 5.5 THOUSAND/UL (ref 3.8–10.8)

## 2025-06-27 ENCOUNTER — TELEPHONE (OUTPATIENT)
Dept: PRIMARY CARE | Facility: CLINIC | Age: 75
End: 2025-06-27
Payer: MEDICARE

## 2025-06-27 NOTE — TELEPHONE ENCOUNTER
----- Message from Betito Cannon sent at 6/27/2025  7:58 AM EDT -----  PSA maintaining at 4 range. Will continue to follow it. Please follow up in 6 months   ----- Message -----  From: [facility] Results In  Sent: 6/26/2025   6:12 AM EDT  To: Betito Cannon, DO